# Patient Record
Sex: FEMALE | Race: OTHER | NOT HISPANIC OR LATINO
[De-identification: names, ages, dates, MRNs, and addresses within clinical notes are randomized per-mention and may not be internally consistent; named-entity substitution may affect disease eponyms.]

---

## 2021-08-16 PROBLEM — Z00.00 ENCOUNTER FOR PREVENTIVE HEALTH EXAMINATION: Status: ACTIVE | Noted: 2021-08-16

## 2021-08-18 ENCOUNTER — APPOINTMENT (OUTPATIENT)
Dept: HEMATOLOGY ONCOLOGY | Facility: CLINIC | Age: 41
End: 2021-08-18
Payer: COMMERCIAL

## 2021-08-18 VITALS
DIASTOLIC BLOOD PRESSURE: 80 MMHG | RESPIRATION RATE: 18 BRPM | HEART RATE: 70 BPM | WEIGHT: 146 LBS | TEMPERATURE: 99.1 F | OXYGEN SATURATION: 99 % | SYSTOLIC BLOOD PRESSURE: 127 MMHG | BODY MASS INDEX: 22.91 KG/M2 | HEIGHT: 67 IN

## 2021-08-18 DIAGNOSIS — Z87.42 PERSONAL HISTORY OF OTHER DISEASES OF THE FEMALE GENITAL TRACT: ICD-10-CM

## 2021-08-18 DIAGNOSIS — Z85.3 PERSONAL HISTORY OF MALIGNANT NEOPLASM OF BREAST: ICD-10-CM

## 2021-08-18 DIAGNOSIS — Z87.898 PERSONAL HISTORY OF OTHER SPECIFIED CONDITIONS: ICD-10-CM

## 2021-08-18 DIAGNOSIS — Z87.891 PERSONAL HISTORY OF NICOTINE DEPENDENCE: ICD-10-CM

## 2021-08-18 DIAGNOSIS — Z80.0 FAMILY HISTORY OF MALIGNANT NEOPLASM OF DIGESTIVE ORGANS: ICD-10-CM

## 2021-08-18 DIAGNOSIS — Z86.79 PERSONAL HISTORY OF OTHER DISEASES OF THE CIRCULATORY SYSTEM: ICD-10-CM

## 2021-08-18 DIAGNOSIS — R23.2 FLUSHING: ICD-10-CM

## 2021-08-18 DIAGNOSIS — Z80.3 FAMILY HISTORY OF MALIGNANT NEOPLASM OF BREAST: ICD-10-CM

## 2021-08-18 DIAGNOSIS — Z13.71 ENCOUNTER FOR NONPROCREATIVE SCREENING FOR GENETIC DISEASE CARRIER STATUS: ICD-10-CM

## 2021-08-18 DIAGNOSIS — Z17.0 ESTROGEN RECEPTOR POSITIVE STATUS [ER+]: ICD-10-CM

## 2021-08-18 DIAGNOSIS — Z78.9 OTHER SPECIFIED HEALTH STATUS: ICD-10-CM

## 2021-08-18 DIAGNOSIS — Z87.19 PERSONAL HISTORY OF OTHER DISEASES OF THE DIGESTIVE SYSTEM: ICD-10-CM

## 2021-08-18 DIAGNOSIS — B97.7 PAPILLOMAVIRUS AS THE CAUSE OF DISEASES CLASSIFIED ELSEWHERE: ICD-10-CM

## 2021-08-18 PROCEDURE — 99215 OFFICE O/P EST HI 40 MIN: CPT

## 2021-08-18 NOTE — ASSESSMENT
[FreeTextEntry1] : This is a 40-year-old woman, premenopausal, with a history of stage III T2N1 right-sided infiltrating ductal carcinoma high-grade ER/GA positive HER-2/mary negative status post dose dense AC followed by weekly paclitaxel.  On tamoxifen since 2012.  BRCA testing negative. \par \par 1) breast cancer \par Doing well TANJA\par Up-to-date on mammogram ultrasound and MRI will continue to alternate these exams so that imaging is done every 6 months\par Tolerating tamoxifen well\par Plan will be to continue until 2022 at which time she will have completed 10 years.  At that point we will discuss the possibility of Evista\par , If the patient is postmenopausal we could consider a few years of aromatase inhibitor in order to get some additional benefit from these agents.\par At this point the patient is still premenopausal and cannot be switched to an aromatase inhibitor\par Check breast cancer markers and estradiol levels\par Continue  tamoxifen\par Discussed symmetry surgery and encourage the patient to follow-up with reconstructive surgeon\par \par 2) vit d def \par cont vit d check level \par \par 3) lung nodules \par follow up with pulmonary \par advised ct scan she states she will do with pulm \par \par 4) b12 def \par check b12 level \par \par 5) iron def \par check iron levels \par gi consult \par \par Discussed with patient at length\par Follow-up in 6 months\par \par \par

## 2021-08-18 NOTE — PHYSICAL EXAM
[Thin] : thin [Normal] : affect appropriate [de-identified] : Right breast is smaller than left breast due to surgery and prior treatments including radiation, no masses or adenopathy palpable in either breast, no skin changes

## 2021-08-18 NOTE — HISTORY OF PRESENT ILLNESS
[de-identified] : This is a 40-year-old premenopausal woman with a history of stage III  T2N1 right infiltrating ductal carcinoma the breast, high-grade ER/NC positive HER-2/mary negative status post chemotherapy with dose dense AC followed by paclitaxel.  Patient received this with a different oncologist.  Patient was then started on tamoxifen.  She is BRCA negative\par Also has a history of B12 and folate and iron deficiency\par \par MRI breast June 2021  negative \par mammogram January 2021 negative \par breast ultrasound January 2021- stable  7 mm posterior cystic lesions left breast at 3 o'clock position [de-identified] : feeling well no new complaints \par 10 years out from breast surgery  , still on tamoxifen ( 9yrs) \par periods are irregular , getting less and less\par some hot flashes  no consistantly bad \par no muscle cramps \par mri of june 2021 negative \par up todate date gyn dr lockhart feb 2021 \par colonosocpy up todate 2018 ( every 5 years) \par still havig cough , has follow up with pulmonary , dr philip  ([facility]) \par Last CT scan was 2018 of the lungs\par Blood work from February 2021 shows normal breast cancer markers and a high estradiol level\par \par

## 2021-08-18 NOTE — REASON FOR VISIT
[Follow-Up Visit] : a follow-up [FreeTextEntry2] : Patient presents for evaluation of a history of breast cancer

## 2021-08-18 NOTE — REVIEW OF SYSTEMS
[Fatigue] : fatigue [Recent Change In Weight] : ~T recent weight change [Cough] : cough [Negative] : Endocrine [Shortness Of Breath] : no shortness of breath [FreeTextEntry2] : working out  [FreeTextEntry4] : sinus congestion  [FreeTextEntry6] : cough  chronic  [FreeTextEntry9] : occ bone pain after chemo  [de-identified] : hot flashes interupt sleep taking cbd

## 2021-08-19 ENCOUNTER — TRANSCRIPTION ENCOUNTER (OUTPATIENT)
Age: 41
End: 2021-08-19

## 2021-08-19 PROBLEM — Z87.898 HISTORY OF PALPITATIONS: Status: RESOLVED | Noted: 2021-08-19 | Resolved: 2021-08-19

## 2021-08-19 PROBLEM — Z87.898 HISTORY OF FATIGUE: Status: RESOLVED | Noted: 2021-08-19 | Resolved: 2021-08-19

## 2021-08-19 PROBLEM — Z86.79 HISTORY OF PAROXYSMAL SUPRAVENTRICULAR TACHYCARDIA: Status: RESOLVED | Noted: 2021-08-19 | Resolved: 2021-08-19

## 2021-08-19 PROBLEM — Z80.0 FAMILY HISTORY OF MALIGNANT NEOPLASM OF COLON: Status: ACTIVE | Noted: 2021-08-19

## 2021-08-19 PROBLEM — Z87.898 HISTORY OF DIARRHEA: Status: RESOLVED | Noted: 2021-08-19 | Resolved: 2021-08-19

## 2021-08-19 PROBLEM — Z87.19 HISTORY OF CONSTIPATION: Status: RESOLVED | Noted: 2021-08-19 | Resolved: 2021-08-19

## 2021-08-19 PROBLEM — Z85.3 HISTORY OF MALIGNANT NEOPLASM OF BREAST: Status: RESOLVED | Noted: 2021-08-19 | Resolved: 2021-08-19

## 2021-08-19 PROBLEM — B97.7 HPV IN FEMALE: Status: RESOLVED | Noted: 2021-08-19 | Resolved: 2021-08-19

## 2021-08-19 PROBLEM — R23.2 HOT FLASHES: Status: RESOLVED | Noted: 2021-08-19 | Resolved: 2021-08-19

## 2021-08-19 PROBLEM — Z80.3 FAMILY HISTORY OF MALIGNANT NEOPLASM OF BREAST: Status: ACTIVE | Noted: 2021-08-18

## 2021-08-19 PROBLEM — Z85.3 HISTORY OF INFILTRATING DUCTAL CARCINOMA OF BREAST: Status: RESOLVED | Noted: 2021-08-19 | Resolved: 2021-08-19

## 2021-08-19 PROBLEM — Z17.0 ER+ (ESTROGEN RECEPTOR POSITIVE STATUS): Status: RESOLVED | Noted: 2021-08-19 | Resolved: 2021-08-19

## 2021-08-19 PROBLEM — Z87.42 HISTORY OF ENDOMETRIOSIS: Status: RESOLVED | Noted: 2021-08-19 | Resolved: 2021-08-19

## 2021-08-19 PROBLEM — Z78.9 SOCIAL ALCOHOL USE: Status: ACTIVE | Noted: 2021-08-19

## 2021-08-19 PROBLEM — Z87.898 HISTORY OF GENETIC COUNSELING: Status: RESOLVED | Noted: 2021-08-19 | Resolved: 2021-08-19

## 2021-08-19 PROBLEM — Z13.71 BRCA NEGATIVE: Status: RESOLVED | Noted: 2021-08-19 | Resolved: 2021-08-19

## 2021-08-19 PROBLEM — Z87.891 FORMER SMOKER: Status: ACTIVE | Noted: 2021-08-19

## 2021-08-20 LAB
25(OH)D3 SERPL-MCNC: 55.3 NG/ML
ALBUMIN SERPL ELPH-MCNC: 4.6 G/DL
ALP BLD-CCNC: 46 U/L
ALT SERPL-CCNC: 11 U/L
ANION GAP SERPL CALC-SCNC: 16 MMOL/L
AST SERPL-CCNC: 17 U/L
BILIRUB SERPL-MCNC: 0.3 MG/DL
BUN SERPL-MCNC: 15 MG/DL
CALCIUM SERPL-MCNC: 9.8 MG/DL
CANCER AG125 SERPL-ACNC: 12 U/ML
CANCER AG15-3 SERPL-ACNC: 19.2 U/ML
CEA SERPL-MCNC: 2 NG/ML
CHLORIDE SERPL-SCNC: 103 MMOL/L
CO2 SERPL-SCNC: 25 MMOL/L
CREAT SERPL-MCNC: 0.79 MG/DL
ESTRADIOL SERPL-MCNC: 307 PG/ML
FERRITIN SERPL-MCNC: 116 NG/ML
FOLATE SERPL-MCNC: 16 NG/ML
GLUCOSE SERPL-MCNC: 53 MG/DL
IRON SATN MFR SERPL: 26 %
IRON SERPL-MCNC: 76 UG/DL
POTASSIUM SERPL-SCNC: 4.5 MMOL/L
PROT SERPL-MCNC: 7.1 G/DL
SODIUM SERPL-SCNC: 144 MMOL/L
TIBC SERPL-MCNC: 287 UG/DL
TSH SERPL-ACNC: 1.13 UIU/ML
UIBC SERPL-MCNC: 212 UG/DL
VIT B12 SERPL-MCNC: 588 PG/ML

## 2021-09-22 ENCOUNTER — NON-APPOINTMENT (OUTPATIENT)
Age: 41
End: 2021-09-22

## 2022-02-18 ENCOUNTER — APPOINTMENT (OUTPATIENT)
Dept: HEMATOLOGY ONCOLOGY | Facility: CLINIC | Age: 42
End: 2022-02-18
Payer: COMMERCIAL

## 2022-02-18 VITALS
SYSTOLIC BLOOD PRESSURE: 122 MMHG | TEMPERATURE: 98.6 F | HEART RATE: 72 BPM | WEIGHT: 145 LBS | RESPIRATION RATE: 18 BRPM | DIASTOLIC BLOOD PRESSURE: 80 MMHG | OXYGEN SATURATION: 97 % | HEIGHT: 67 IN | BODY MASS INDEX: 22.76 KG/M2

## 2022-02-18 DIAGNOSIS — C50.919 MALIGNANT NEOPLASM OF UNSPECIFIED SITE OF UNSPECIFIED FEMALE BREAST: ICD-10-CM

## 2022-02-18 DIAGNOSIS — M85.80 OTHER SPECIFIED DISORDERS OF BONE DENSITY AND STRUCTURE, UNSPECIFIED SITE: ICD-10-CM

## 2022-02-18 DIAGNOSIS — E53.8 DEFICIENCY OF OTHER SPECIFIED B GROUP VITAMINS: ICD-10-CM

## 2022-02-18 DIAGNOSIS — R53.83 OTHER FATIGUE: ICD-10-CM

## 2022-02-18 DIAGNOSIS — E61.1 IRON DEFICIENCY: ICD-10-CM

## 2022-02-18 DIAGNOSIS — R91.1 SOLITARY PULMONARY NODULE: ICD-10-CM

## 2022-02-18 DIAGNOSIS — E55.9 VITAMIN D DEFICIENCY, UNSPECIFIED: ICD-10-CM

## 2022-02-18 PROCEDURE — 99214 OFFICE O/P EST MOD 30 MIN: CPT

## 2022-02-18 RX ORDER — GABAPENTIN 100 MG/1
100 CAPSULE ORAL
Qty: 90 | Refills: 5 | Status: ACTIVE | COMMUNITY
Start: 2022-02-18 | End: 1900-01-01

## 2022-02-18 NOTE — ASSESSMENT
[FreeTextEntry1] : This is a 40-year-old woman, premenopausal, with a history of stage III T2N1 right-sided infiltrating ductal carcinoma high-grade ER/SD positive HER-2/mary negative status post dose dense AC followed by weekly paclitaxel.  On tamoxifen since 2012.  BRCA testing negative. \par \par 1) breast cancer \par Doing well TANJA\par mammogram and ultrasound  jan 2022 \par mri in july 2022 \par Tolerating tamoxifen well, patient is aware of the increased risk of thrombosis as well as endometrial cancer\par She is up-to-date with gynecology evaluation and continues on a baby aspirin.\par Continue  tamoxifen will complete 10 years in aug 2022 , we discussed that 10 years of tamoxifen is standard of care and that any additional treatment would be not necessarily indicated in this setting.  However 1 could consider once she goes through menopause, doing 2 years of an aromatase inhibitor to give some additional benefit and risk reduction.  However patient has not gone through menopause yet so we would have to wait at least a year until she has had a cycle and also would want a confirm with blood work.\par We also discussed the possibility of low-dose tamoxifen or Evista upon completion of the 10 years.  However I explained that really the standard of care would be to stop tamoxifen at the end of the 10 years.\par \par 2) vit d def \par cont vit d check level \par \par 3) lung nodules \par s/p follow up with pulmonary \par ct scan of chest will be performed \par \par 4) b12 def \par check b12 level \par \par 5) iron def \par check iron levels \par gi consult \par \par 6) constipation \par advise follow up with gi and possible repeat colonoscpy \par \par genetics negative \par \par Discussed with patient at length\par Follow-up in 6 months\par \par \par

## 2022-02-18 NOTE — HISTORY OF PRESENT ILLNESS
[de-identified] : This is a 40-year-old premenopausal woman with a history of stage III  T2N1 right infiltrating ductal carcinoma the breast, high-grade ER/MA positive HER-2/mary negative status post chemotherapy with dose dense AC followed by paclitaxel.  Patient received this with a different oncologist.  Patient was then started on tamoxifen.  She is BRCA negative\par Also has a history of B12 and folate and iron deficiency\par \par MRI breast June 2021  negative \par mammogram January 2021 negative \par breast ultrasound January 2021- stable  7 mm posterior cystic lesions left breast at 3 o'clock position [de-identified] : \par last period nov 2021 \par tamoxifen continues on ( 9th year) \par mammo and ultrasound  jan 2022\par hot flashes  really bad at night \par cough is about the same - saw pulmonary , did xray negative , one more ct scan then stop \par gyn dr lockhart april 2022 \par mri of breast july 2022 \par colonoscopy up to date \par constipated

## 2022-02-18 NOTE — PHYSICAL EXAM
[Thin] : thin [Normal] : affect appropriate [de-identified] : Right breast is smaller than left breast , no masses or adenopathy palpable in either breast, no skin changes

## 2022-02-20 LAB
25(OH)D3 SERPL-MCNC: 58.4 NG/ML
CANCER AG15-3 SERPL-ACNC: 17.6 U/ML
CEA SERPL-MCNC: 1.7 NG/ML
ESTRADIOL SERPL-MCNC: 149 PG/ML
FERRITIN SERPL-MCNC: 102 NG/ML
FOLATE SERPL-MCNC: 13.6 NG/ML
VIT B12 SERPL-MCNC: 361 PG/ML

## 2022-02-24 ENCOUNTER — NON-APPOINTMENT (OUTPATIENT)
Age: 42
End: 2022-02-24

## 2022-03-24 RX ORDER — TAMOXIFEN CITRATE 20 MG/1
20 TABLET, FILM COATED ORAL DAILY
Qty: 90 | Refills: 3 | Status: ACTIVE | COMMUNITY
Start: 2022-03-24 | End: 1900-01-01

## 2023-02-01 ENCOUNTER — APPOINTMENT (OUTPATIENT)
Dept: HEMATOLOGY ONCOLOGY | Facility: CLINIC | Age: 43
End: 2023-02-01